# Patient Record
(demographics unavailable — no encounter records)

---

## 2025-07-21 NOTE — CONSULT LETTER
[Today's Date] : [unfilled] [Dear  ___:] : Dear Dr. [unfilled]: [Consult - Single Provider] : Thank you very much for allowing me to participate in the care of this patient. If you have any questions, please do not hesitate to contact me. [Sincerely,] : Sincerely, [FreeTextEntry4] : Dr Ballard [FreeTextEntry5] : [address] [FreeTextEntry6] : [facility], [place] [address]

## 2025-07-21 NOTE — CONSULT LETTER
[Today's Date] : [unfilled] [Dear  ___:] : Dear Dr. [unfilled]: [Consult - Single Provider] : Thank you very much for allowing me to participate in the care of this patient. If you have any questions, please do not hesitate to contact me. [Sincerely,] : Sincerely, [FreeTextEntry4] : Dr Ballard [FreeTextEntry5] : [address] [FreeTextEntry6] : [facility] [id number]

## 2025-07-21 NOTE — REASON FOR VISIT
[Initial Consultation] : an initial consultation for [FreeTextEntry3] : Cardiac Consult [Mother] : mother